# Patient Record
Sex: FEMALE | Race: WHITE | NOT HISPANIC OR LATINO | Employment: FULL TIME | URBAN - NONMETROPOLITAN AREA
[De-identification: names, ages, dates, MRNs, and addresses within clinical notes are randomized per-mention and may not be internally consistent; named-entity substitution may affect disease eponyms.]

---

## 2017-03-29 ENCOUNTER — TRANSCRIBE ORDERS (OUTPATIENT)
Dept: ADMINISTRATIVE | Facility: HOSPITAL | Age: 57
End: 2017-03-29

## 2017-03-29 DIAGNOSIS — R74.8 ELEVATED LIVER ENZYMES: Primary | ICD-10-CM

## 2019-06-03 ENCOUNTER — HOSPITAL ENCOUNTER (OUTPATIENT)
Dept: GENERAL RADIOLOGY | Facility: HOSPITAL | Age: 59
Discharge: HOME | End: 2019-06-03
Admitting: INTERNAL MEDICINE

## 2019-06-03 ENCOUNTER — TRANSCRIBE ORDERS (OUTPATIENT)
Dept: GENERAL RADIOLOGY | Facility: HOSPITAL | Age: 59
End: 2019-06-03

## 2019-06-03 DIAGNOSIS — Z78.0 POST-MENOPAUSAL: ICD-10-CM

## 2019-06-03 DIAGNOSIS — Z78.0 POST-MENOPAUSAL: Primary | ICD-10-CM

## 2019-06-03 PROCEDURE — 77080 DXA BONE DENSITY AXIAL: CPT

## 2023-03-13 RX ORDER — IBANDRONATE SODIUM 150 MG/1
150 TABLET, FILM COATED ORAL
Status: ON HOLD | COMMUNITY
End: 2023-04-06

## 2023-03-29 NOTE — PROGRESS NOTES
Chief Complaint   Patient presents with   • Colonoscopy     Positive cologuard  3-27-14 had colon normal 5 year recall       PCP: Nathan Taylor DO  REFER: No ref. provider found    Subjective     HPI    She presents to office with positive cologaurd test from PCP office .     She denies change in bowels.  Bowels described as moving without difficulty, no change. No abdominal pain.  No BRBPR.  No melena.  Weight is stable.  She has undergone previous colonoscopy evaluation.  Lidia Vásquez has a personal history of colon polyps.  There is not a family history of colon cancer or colon polyps.    SCANNED - COLONOSCOPY (03/27/2014 00:00)-normal     Past Medical History:   Diagnosis Date   • Diabetes mellitus (HCC)        Past Surgical History:   Procedure Laterality Date   • COLONOSCOPY  03/27/2014    normal       Outpatient Medications Marked as Taking for the 3/30/23 encounter (Office Visit) with Augusto Gomez APRN   Medication Sig Dispense Refill   • insulin NPH-insulin regular (humuLIN 70/30,novoLIN 70/30) (70-30) 100 UNIT/ML injection Inject  under the skin into the appropriate area as directed 2 (Two) Times a Day With Meals.     • moxifloxacin (VIGAMOX) 0.5 % ophthalmic solution INSTILL ONE DROP INTO THE LEFT EYE FOUR TIMES A DAY.     • multivitamin (MULTI-VITAMIN DAILY PO) Take  by mouth Daily.     • timolol (TIMOPTIC) 0.5 % ophthalmic solution INSTILL 1 DROP INTO THE LEFT EYE ONE TIME DAILY         Allergies   Allergen Reactions   • Contrast Dye (Echo Or Unknown Ct/Mr) Other (See Comments)     unsure       Social History     Socioeconomic History   • Marital status:    Tobacco Use   • Smoking status: Never   • Smokeless tobacco: Never   Vaping Use   • Vaping Use: Never used   Substance and Sexual Activity   • Alcohol use: Yes     Comment: wine 3 times weekly   • Drug use: Never       Review of Systems   Constitutional: Negative for unexpected weight change.   Respiratory: Negative for shortness of  "breath.    Cardiovascular: Negative for chest pain.   Gastrointestinal: Negative for abdominal pain and anal bleeding.       Objective     Vitals:    03/30/23 1007   BP: 126/80   Pulse: 70   Temp: 98.6 °F (37 °C)   SpO2: 97%   Weight: 68 kg (150 lb)   Height: 167.6 cm (66\")     Body mass index is 24.21 kg/m².    Physical Exam  Constitutional:       Appearance: Normal appearance. She is well-developed.   Eyes:      General: No scleral icterus.  Cardiovascular:      Rate and Rhythm: Regular rhythm.      Heart sounds: Normal heart sounds. No murmur heard.  Pulmonary:      Effort: Pulmonary effort is normal. No accessory muscle usage.      Breath sounds: Normal breath sounds.   Abdominal:      General: Bowel sounds are normal. There is no distension.      Palpations: Abdomen is soft. There is no mass.      Tenderness: There is no abdominal tenderness. There is no guarding or rebound.   Skin:     General: Skin is warm and dry.      Coloration: Skin is not jaundiced.   Neurological:      Mental Status: She is alert.   Psychiatric:         Behavior: Behavior is cooperative.         Imaging Results (Most Recent)     None          Body mass index is 24.21 kg/m².    Assessment & Plan     Diagnoses and all orders for this visit:    1. Positive colorectal cancer screening using DNA-based stool test (Primary)  -     Case Request; Standing  -     Case Request    2. History of colon polyps    Other orders  -     Implement Anesthesia Orders Day of Procedure; Standing  -     Obtain Informed Consent; Standing  -     sodium-potassium-magnesium sulfates (Suprep Bowel Prep Kit) 17.5-3.13-1.6 GM/177ML solution oral solution; Take 1 bottle by mouth Take As Directed. Take as directed  Dispense: 177 mL; Refill: 0        COLONOSCOPY WITH ANESTHESIA (N/A)    Pt to hold diabetes medication/insulin day of procedure to prevent any risk of complications of hypoglycemia intraprocedure.  If taking insulin 1/2 the PM dose as well     Advised pt to " stop use of NSAIDs, Fish Oil, and MV 5 days prior to procedure, per Dr Valladares protocol.  Tylenol based products are ok to take.  Pt verbalized understanding.     I have explained a positive cologuard indicates the presence of DNA/hgb in stool that is associated with colon polyps or colon cancer.  There is a chance the test is a false positive with that chance being higher for people over the age of 65. This is due to normal changes in DNA associated with getting older (AGA).  Due to the positive result of the Cologuard I recommend pt undergoing colonoscopy.  Colonoscopy remains the gold standard for detection of colon polyps/colon cancer.      All risks, benefits, alternatives, and indications of colonoscopy procedure have been discussed with the patient. Risks to include perforation of the colon requiring possible surgery or colostomy, risk of bleeding from biopsies or removal of colon tissue, possibility of missing a colon polyp or cancer, or adverse drug reaction.  Benefits to include the diagnosis and management of disease of the colon and rectum.  Pt verbalizes understanding and agrees to proceed with procedure.    I have explained having an adequate and complete prep is associated with success of colonoscopy.   I have explained to Lidia TUCKRE Fahad that not having an adequate bowel prep can lead to decreased rate of  adenoma detection, longer procedure times, and higher chance the physician will not be able to successfully complete full colonoscopy (able to intubate cecum).   I have discussed bowel prep options of golytley vs suprep vs clenpiq vs Miralax prep.  Lidia Vásquez has elected to proceed with suprep.   I have also discussed that there are a variety of prep options however prep that is prescribed is influenced on patient health history, how well bowels move on regular basis, and individualized insurance plan.  I have explained that we are not able to know the amount of coverage each individual insurance plan  provides for preps.  I asked Lidia Vásquez to please call our office if we need to send prescription/provide instructions for another prep            Augusto Gomez, APRN  03/30/23            There are no Patient Instructions on file for this visit.

## 2023-03-29 NOTE — H&P (VIEW-ONLY)
Chief Complaint   Patient presents with   • Colonoscopy     Positive cologuard  3-27-14 had colon normal 5 year recall       PCP: Nathan Taylor DO  REFER: No ref. provider found    Subjective     HPI    She presents to office with positive cologaurd test from PCP office .     She denies change in bowels.  Bowels described as moving without difficulty, no change. No abdominal pain.  No BRBPR.  No melena.  Weight is stable.  She has undergone previous colonoscopy evaluation.  Lidia Vásquez has a personal history of colon polyps.  There is not a family history of colon cancer or colon polyps.    SCANNED - COLONOSCOPY (03/27/2014 00:00)-normal     Past Medical History:   Diagnosis Date   • Diabetes mellitus (HCC)        Past Surgical History:   Procedure Laterality Date   • COLONOSCOPY  03/27/2014    normal       Outpatient Medications Marked as Taking for the 3/30/23 encounter (Office Visit) with Augusto Gomez APRN   Medication Sig Dispense Refill   • insulin NPH-insulin regular (humuLIN 70/30,novoLIN 70/30) (70-30) 100 UNIT/ML injection Inject  under the skin into the appropriate area as directed 2 (Two) Times a Day With Meals.     • moxifloxacin (VIGAMOX) 0.5 % ophthalmic solution INSTILL ONE DROP INTO THE LEFT EYE FOUR TIMES A DAY.     • multivitamin (MULTI-VITAMIN DAILY PO) Take  by mouth Daily.     • timolol (TIMOPTIC) 0.5 % ophthalmic solution INSTILL 1 DROP INTO THE LEFT EYE ONE TIME DAILY         Allergies   Allergen Reactions   • Contrast Dye (Echo Or Unknown Ct/Mr) Other (See Comments)     unsure       Social History     Socioeconomic History   • Marital status:    Tobacco Use   • Smoking status: Never   • Smokeless tobacco: Never   Vaping Use   • Vaping Use: Never used   Substance and Sexual Activity   • Alcohol use: Yes     Comment: wine 3 times weekly   • Drug use: Never       Review of Systems   Constitutional: Negative for unexpected weight change.   Respiratory: Negative for shortness of  "breath.    Cardiovascular: Negative for chest pain.   Gastrointestinal: Negative for abdominal pain and anal bleeding.       Objective     Vitals:    03/30/23 1007   BP: 126/80   Pulse: 70   Temp: 98.6 °F (37 °C)   SpO2: 97%   Weight: 68 kg (150 lb)   Height: 167.6 cm (66\")     Body mass index is 24.21 kg/m².    Physical Exam  Constitutional:       Appearance: Normal appearance. She is well-developed.   Eyes:      General: No scleral icterus.  Cardiovascular:      Rate and Rhythm: Regular rhythm.      Heart sounds: Normal heart sounds. No murmur heard.  Pulmonary:      Effort: Pulmonary effort is normal. No accessory muscle usage.      Breath sounds: Normal breath sounds.   Abdominal:      General: Bowel sounds are normal. There is no distension.      Palpations: Abdomen is soft. There is no mass.      Tenderness: There is no abdominal tenderness. There is no guarding or rebound.   Skin:     General: Skin is warm and dry.      Coloration: Skin is not jaundiced.   Neurological:      Mental Status: She is alert.   Psychiatric:         Behavior: Behavior is cooperative.         Imaging Results (Most Recent)     None          Body mass index is 24.21 kg/m².    Assessment & Plan     Diagnoses and all orders for this visit:    1. Positive colorectal cancer screening using DNA-based stool test (Primary)  -     Case Request; Standing  -     Case Request    2. History of colon polyps    Other orders  -     Implement Anesthesia Orders Day of Procedure; Standing  -     Obtain Informed Consent; Standing  -     sodium-potassium-magnesium sulfates (Suprep Bowel Prep Kit) 17.5-3.13-1.6 GM/177ML solution oral solution; Take 1 bottle by mouth Take As Directed. Take as directed  Dispense: 177 mL; Refill: 0        COLONOSCOPY WITH ANESTHESIA (N/A)    Pt to hold diabetes medication/insulin day of procedure to prevent any risk of complications of hypoglycemia intraprocedure.  If taking insulin 1/2 the PM dose as well     Advised pt to " stop use of NSAIDs, Fish Oil, and MV 5 days prior to procedure, per Dr Valladares protocol.  Tylenol based products are ok to take.  Pt verbalized understanding.     I have explained a positive cologuard indicates the presence of DNA/hgb in stool that is associated with colon polyps or colon cancer.  There is a chance the test is a false positive with that chance being higher for people over the age of 65. This is due to normal changes in DNA associated with getting older (AGA).  Due to the positive result of the Cologuard I recommend pt undergoing colonoscopy.  Colonoscopy remains the gold standard for detection of colon polyps/colon cancer.      All risks, benefits, alternatives, and indications of colonoscopy procedure have been discussed with the patient. Risks to include perforation of the colon requiring possible surgery or colostomy, risk of bleeding from biopsies or removal of colon tissue, possibility of missing a colon polyp or cancer, or adverse drug reaction.  Benefits to include the diagnosis and management of disease of the colon and rectum.  Pt verbalizes understanding and agrees to proceed with procedure.    I have explained having an adequate and complete prep is associated with success of colonoscopy.   I have explained to Lidia TUCKER Fahad that not having an adequate bowel prep can lead to decreased rate of  adenoma detection, longer procedure times, and higher chance the physician will not be able to successfully complete full colonoscopy (able to intubate cecum).   I have discussed bowel prep options of golytley vs suprep vs clenpiq vs Miralax prep.  Lidia Vásquez has elected to proceed with suprep.   I have also discussed that there are a variety of prep options however prep that is prescribed is influenced on patient health history, how well bowels move on regular basis, and individualized insurance plan.  I have explained that we are not able to know the amount of coverage each individual insurance plan  provides for preps.  I asked Lidia Vásquez to please call our office if we need to send prescription/provide instructions for another prep            Augusto Gomez, APRN  03/30/23            There are no Patient Instructions on file for this visit.

## 2023-03-30 ENCOUNTER — OFFICE VISIT (OUTPATIENT)
Dept: GASTROENTEROLOGY | Facility: CLINIC | Age: 63
End: 2023-03-30
Payer: MEDICARE

## 2023-03-30 VITALS
HEIGHT: 66 IN | TEMPERATURE: 98.6 F | HEART RATE: 70 BPM | SYSTOLIC BLOOD PRESSURE: 126 MMHG | WEIGHT: 150 LBS | DIASTOLIC BLOOD PRESSURE: 80 MMHG | BODY MASS INDEX: 24.11 KG/M2 | OXYGEN SATURATION: 97 %

## 2023-03-30 DIAGNOSIS — Z86.010 HISTORY OF COLON POLYPS: ICD-10-CM

## 2023-03-30 DIAGNOSIS — R19.5 POSITIVE COLORECTAL CANCER SCREENING USING DNA-BASED STOOL TEST: Primary | ICD-10-CM

## 2023-03-30 RX ORDER — MOXIFLOXACIN 5 MG/ML
SOLUTION/ DROPS OPHTHALMIC
COMMUNITY
Start: 2023-01-31

## 2023-03-30 RX ORDER — SODIUM, POTASSIUM,MAG SULFATES 17.5-3.13G
177 SOLUTION, RECONSTITUTED, ORAL ORAL TAKE AS DIRECTED
Qty: 177 ML | Refills: 0 | Status: CANCELLED | OUTPATIENT
Start: 2023-03-30

## 2023-03-30 RX ORDER — SODIUM, POTASSIUM,MAG SULFATES 17.5-3.13G
177 SOLUTION, RECONSTITUTED, ORAL ORAL TAKE AS DIRECTED
Qty: 177 ML | Refills: 0 | Status: ON HOLD | OUTPATIENT
Start: 2023-03-30 | End: 2023-04-06

## 2023-03-30 RX ORDER — SODIUM PICOSULFATE, MAGNESIUM OXIDE, AND ANHYDROUS CITRIC ACID 10; 3.5; 12 MG/160ML; G/160ML; G/160ML
1 LIQUID ORAL TAKE AS DIRECTED
Qty: 320 ML | Refills: 0 | Status: CANCELLED | OUTPATIENT
Start: 2023-03-30

## 2023-03-30 RX ORDER — TIMOLOL MALEATE 5 MG/ML
SOLUTION/ DROPS OPHTHALMIC
COMMUNITY
Start: 2023-01-20

## 2023-03-30 RX ORDER — INSULIN GLARGINE 100 [IU]/ML
12 INJECTION, SOLUTION SUBCUTANEOUS
COMMUNITY

## 2023-03-30 RX ORDER — DIPHENOXYLATE HYDROCHLORIDE AND ATROPINE SULFATE 2.5; .025 MG/1; MG/1
TABLET ORAL DAILY
COMMUNITY

## 2023-04-06 ENCOUNTER — ANESTHESIA EVENT (OUTPATIENT)
Dept: GASTROENTEROLOGY | Facility: HOSPITAL | Age: 63
End: 2023-04-06
Payer: MEDICARE

## 2023-04-06 ENCOUNTER — ANESTHESIA (OUTPATIENT)
Dept: GASTROENTEROLOGY | Facility: HOSPITAL | Age: 63
End: 2023-04-06
Payer: MEDICARE

## 2023-04-06 ENCOUNTER — HOSPITAL ENCOUNTER (OUTPATIENT)
Facility: HOSPITAL | Age: 63
Setting detail: HOSPITAL OUTPATIENT SURGERY
Discharge: HOME | End: 2023-04-06
Attending: INTERNAL MEDICINE | Admitting: INTERNAL MEDICINE
Payer: MEDICARE

## 2023-04-06 VITALS
SYSTOLIC BLOOD PRESSURE: 99 MMHG | WEIGHT: 145 LBS | OXYGEN SATURATION: 100 % | TEMPERATURE: 97.1 F | HEART RATE: 86 BPM | HEIGHT: 66 IN | DIASTOLIC BLOOD PRESSURE: 66 MMHG | RESPIRATION RATE: 15 BRPM | BODY MASS INDEX: 23.3 KG/M2

## 2023-04-06 LAB — GLUCOSE BLDC GLUCOMTR-MCNC: 259 MG/DL (ref 70–130)

## 2023-04-06 PROCEDURE — 25010000002 PROPOFOL 10 MG/ML EMULSION: Performed by: NURSE ANESTHETIST, CERTIFIED REGISTERED

## 2023-04-06 PROCEDURE — 45378 DIAGNOSTIC COLONOSCOPY: CPT | Performed by: INTERNAL MEDICINE

## 2023-04-06 PROCEDURE — 82962 GLUCOSE BLOOD TEST: CPT

## 2023-04-06 RX ORDER — PROPOFOL 10 MG/ML
VIAL (ML) INTRAVENOUS AS NEEDED
Status: DISCONTINUED | OUTPATIENT
Start: 2023-04-06 | End: 2023-04-06 | Stop reason: SURG

## 2023-04-06 RX ORDER — SODIUM CHLORIDE 0.9 % (FLUSH) 0.9 %
10 SYRINGE (ML) INJECTION AS NEEDED
Status: DISCONTINUED | OUTPATIENT
Start: 2023-04-06 | End: 2023-04-06 | Stop reason: HOSPADM

## 2023-04-06 RX ORDER — LIDOCAINE HYDROCHLORIDE 20 MG/ML
INJECTION, SOLUTION EPIDURAL; INFILTRATION; INTRACAUDAL; PERINEURAL AS NEEDED
Status: DISCONTINUED | OUTPATIENT
Start: 2023-04-06 | End: 2023-04-06 | Stop reason: SURG

## 2023-04-06 RX ORDER — SODIUM CHLORIDE 9 MG/ML
500 INJECTION, SOLUTION INTRAVENOUS CONTINUOUS PRN
Status: DISCONTINUED | OUTPATIENT
Start: 2023-04-06 | End: 2023-04-06 | Stop reason: HOSPADM

## 2023-04-06 RX ORDER — LIDOCAINE HYDROCHLORIDE 10 MG/ML
0.5 INJECTION, SOLUTION EPIDURAL; INFILTRATION; INTRACAUDAL; PERINEURAL ONCE AS NEEDED
Status: DISCONTINUED | OUTPATIENT
Start: 2023-04-06 | End: 2023-04-06 | Stop reason: HOSPADM

## 2023-04-06 RX ADMIN — SODIUM CHLORIDE 500 ML: 9 INJECTION, SOLUTION INTRAVENOUS at 10:51

## 2023-04-06 RX ADMIN — LIDOCAINE HYDROCHLORIDE 50 MG: 20 INJECTION, SOLUTION EPIDURAL; INFILTRATION; INTRACAUDAL; PERINEURAL at 10:57

## 2023-04-06 RX ADMIN — PROPOFOL INJECTABLE EMULSION 300 MG: 10 INJECTION, EMULSION INTRAVENOUS at 10:57

## 2023-04-06 NOTE — ANESTHESIA POSTPROCEDURE EVALUATION
"Patient: Lidia Vásquez    Procedure Summary     Date: 04/06/23 Room / Location: [facility] ENDOSCOPY 2 / [facility]    Anesthesia Start: 1055 Anesthesia Stop: 1110    Procedure: COLONOSCOPY WITH ANESTHESIA Diagnosis:       Positive colorectal cancer screening using DNA-based stool test      (Positive colorectal cancer screening using DNA-based stool test [R19.5])    Surgeons: Luis Valladares DO Provider: Loi Al CRNA    Anesthesia Type: MAC ASA Status: 3          Anesthesia Type: MAC    Vitals  Vitals Value Taken Time   BP     Temp     Pulse 54 04/06/23 1110   Resp     SpO2 99 % 04/06/23 1110   Vitals shown include unvalidated device data.        Post Anesthesia Care and Evaluation    Patient location during evaluation: PHASE II  Patient participation: complete - patient participated  Level of consciousness: awake and alert  Pain score: 0  Pain management: adequate    Airway patency: patent  Anesthetic complications: No anesthetic complications  PONV Status: none  Cardiovascular status: acceptable  Respiratory status: acceptable  Hydration status: acceptable    Comments: Blood pressure 170/64, pulse 88, temperature 97.1 °F (36.2 °C), temperature source Temporal, resp. rate 20, height 167.6 cm (66\"), weight 65.8 kg (145 lb), SpO2 100 %.    Pt discharged from PACU based on celestina score >8      "

## 2023-04-06 NOTE — ANESTHESIA PREPROCEDURE EVALUATION
Anesthesia Evaluation     Patient summary reviewed   no history of anesthetic complications:  NPO Solid Status: > 8 hours             Airway   Mallampati: I  TM distance: >3 FB  No difficulty expected  Dental - normal exam     Pulmonary - negative pulmonary ROS   Cardiovascular   Exercise tolerance: excellent (>7 METS)        Neuro/Psych- negative ROS  GI/Hepatic/Renal/Endo    (+)   diabetes mellitus using insulin,     Musculoskeletal     Abdominal    Substance History      OB/GYN          Other                        Anesthesia Plan    ASA 3     MAC       Anesthetic plan, risks, benefits, and alternatives have been provided, discussed and informed consent has been obtained with: patient.        CODE STATUS:

## 2023-04-19 ENCOUNTER — TELEPHONE (OUTPATIENT)
Dept: GASTROENTEROLOGY | Facility: CLINIC | Age: 63
End: 2023-04-19
Payer: COMMERCIAL

## 2024-12-09 ENCOUNTER — TRANSCRIBE ORDERS (OUTPATIENT)
Dept: ADMINISTRATIVE | Facility: HOSPITAL | Age: 64
End: 2024-12-09
Payer: MEDICARE

## 2024-12-09 DIAGNOSIS — R07.89 CHEST PRESSURE: Primary | ICD-10-CM

## 2025-01-28 ENCOUNTER — HOSPITAL ENCOUNTER (OUTPATIENT)
Dept: CARDIOLOGY | Facility: HOSPITAL | Age: 65
Discharge: HOME | End: 2025-01-28
Admitting: INTERNAL MEDICINE
Payer: MEDICARE

## 2025-01-28 VITALS — WEIGHT: 135 LBS | HEIGHT: 66 IN | BODY MASS INDEX: 21.69 KG/M2

## 2025-01-28 DIAGNOSIS — R07.89 CHEST PRESSURE: ICD-10-CM

## 2025-01-28 LAB
BH CV STRESS BP STAGE 1: NORMAL
BH CV STRESS BP STAGE 2: NORMAL
BH CV STRESS DURATION MIN STAGE 1: 3
BH CV STRESS DURATION MIN STAGE 2: 2
BH CV STRESS DURATION SEC STAGE 1: 0
BH CV STRESS DURATION SEC STAGE 2: 19
BH CV STRESS GRADE STAGE 1: 10
BH CV STRESS GRADE STAGE 2: 12
BH CV STRESS HR STAGE 1: 116
BH CV STRESS HR STAGE 2: 136
BH CV STRESS METS STAGE 1: 5
BH CV STRESS METS STAGE 2: 7.5
BH CV STRESS PROTOCOL 1: NORMAL
BH CV STRESS RECOVERY BP: NORMAL MMHG
BH CV STRESS RECOVERY HR: 82 BPM
BH CV STRESS SPEED STAGE 1: 1.7
BH CV STRESS SPEED STAGE 2: 2.5
BH CV STRESS STAGE 1: 1
BH CV STRESS STAGE 2: 2
MAXIMAL PREDICTED HEART RATE: 153 BPM
PERCENT MAX PREDICTED HR: 88.89 %
STRESS BASELINE BP: NORMAL MMHG
STRESS BASELINE HR: 79 BPM
STRESS PERCENT HR: 105 %
STRESS POST ESTIMATED WORKLOAD: 7.5 METS
STRESS POST EXERCISE DUR MIN: 5 MIN
STRESS POST EXERCISE DUR SEC: 19 SEC
STRESS POST PEAK BP: NORMAL MMHG
STRESS POST PEAK HR: 136 BPM
STRESS TARGET HR: 130 BPM

## 2025-01-28 PROCEDURE — 93350 STRESS TTE ONLY: CPT

## 2025-01-28 PROCEDURE — 93017 CV STRESS TEST TRACING ONLY: CPT

## (undated) DEVICE — THE CHANNEL CLEANING BRUSH IS A NYLON FLEXI BRUSH ATTACHED TO A FLEXIBLE PLASTIC SHEATH DESIGNED TO SAFELY REMOVE DEBRIS FROM FLEXIBLE ENDOSCOPES.

## (undated) DEVICE — MASK,OXYGEN,MED CONC,ADLT,7' TUB, UC: Brand: PENDING

## (undated) DEVICE — Device: Brand: DEFENDO AIR/WATER/SUCTION AND BIOPSY VALVE

## (undated) DEVICE — SENSR O2 OXIMAX FNGR A/ 18IN NONSTR

## (undated) DEVICE — YANKAUER,BULB TIP WITH VENT: Brand: ARGYLE